# Patient Record
Sex: FEMALE | Race: WHITE | NOT HISPANIC OR LATINO | Employment: OTHER | URBAN - METROPOLITAN AREA
[De-identification: names, ages, dates, MRNs, and addresses within clinical notes are randomized per-mention and may not be internally consistent; named-entity substitution may affect disease eponyms.]

---

## 2017-01-20 ENCOUNTER — TRANSFERRED RECORDS (OUTPATIENT)
Dept: HEALTH INFORMATION MANAGEMENT | Facility: CLINIC | Age: 67
End: 2017-01-20

## 2017-02-07 ENCOUNTER — OFFICE VISIT (OUTPATIENT)
Dept: FAMILY MEDICINE | Facility: CLINIC | Age: 67
End: 2017-02-07
Payer: COMMERCIAL

## 2017-02-07 VITALS
TEMPERATURE: 98.2 F | OXYGEN SATURATION: 96 % | WEIGHT: 245.2 LBS | SYSTOLIC BLOOD PRESSURE: 124 MMHG | BODY MASS INDEX: 40.8 KG/M2 | DIASTOLIC BLOOD PRESSURE: 70 MMHG | RESPIRATION RATE: 16 BRPM | HEART RATE: 81 BPM

## 2017-02-07 DIAGNOSIS — R31.9 URINARY TRACT INFECTION WITH HEMATURIA, SITE UNSPECIFIED: ICD-10-CM

## 2017-02-07 DIAGNOSIS — R39.15 URINARY URGENCY: Primary | ICD-10-CM

## 2017-02-07 DIAGNOSIS — N39.0 URINARY TRACT INFECTION WITH HEMATURIA, SITE UNSPECIFIED: ICD-10-CM

## 2017-02-07 LAB
ALBUMIN UR-MCNC: NEGATIVE MG/DL
APPEARANCE UR: ABNORMAL
BACTERIA #/AREA URNS HPF: ABNORMAL /HPF
BILIRUB UR QL STRIP: NEGATIVE
COLOR UR AUTO: YELLOW
GLUCOSE UR STRIP-MCNC: NEGATIVE MG/DL
HGB UR QL STRIP: ABNORMAL
KETONES UR STRIP-MCNC: NEGATIVE MG/DL
LEUKOCYTE ESTERASE UR QL STRIP: ABNORMAL
NITRATE UR QL: NEGATIVE
NON-SQ EPI CELLS #/AREA URNS LPF: ABNORMAL /LPF
PH UR STRIP: 7.5 PH (ref 5–7)
RBC #/AREA URNS AUTO: ABNORMAL /HPF (ref 0–2)
SP GR UR STRIP: 1.02 (ref 1–1.03)
URN SPEC COLLECT METH UR: ABNORMAL
UROBILINOGEN UR STRIP-ACNC: 0.2 EU/DL (ref 0.2–1)
WBC #/AREA URNS AUTO: ABNORMAL /HPF (ref 0–2)
WBC CLUMPS #/AREA URNS HPF: PRESENT /HPF

## 2017-02-07 PROCEDURE — 99213 OFFICE O/P EST LOW 20 MIN: CPT | Performed by: INTERNAL MEDICINE

## 2017-02-07 PROCEDURE — 87086 URINE CULTURE/COLONY COUNT: CPT | Performed by: INTERNAL MEDICINE

## 2017-02-07 PROCEDURE — 81001 URINALYSIS AUTO W/SCOPE: CPT | Performed by: INTERNAL MEDICINE

## 2017-02-07 RX ORDER — CIPROFLOXACIN 250 MG/1
250 TABLET, FILM COATED ORAL 2 TIMES DAILY
Qty: 6 TABLET | Refills: 0 | Status: SHIPPED | OUTPATIENT
Start: 2017-02-07

## 2017-02-07 NOTE — PROGRESS NOTES
SUBJECTIVE:                                                    Henna Miller is a 66 year old female who presents to clinic today for the following health issues:    URINARY TRACT SYMPTOMS     Onset: last night     Description:   Painful urination (Dysuria): YES  Blood in urine (Hematuria): YES  Delay in urine (Hesitency): YES    Intensity: severe    Progression of Symptoms: worsening    Accompanying Signs & Symptoms:  Fever/chills: no   Flank pain no   Nausea and vomiting: no   Any vaginal symptoms: none  Abdominal/Pelvic Pain: no    History:   History of frequent UTI's: YES  History of kidney stones: no   Sexually Active: no   Possibility of pregnancy: No    Precipitating factors:   none         Therapies Tried and outcome: nothing    Problem list and histories reviewed & adjusted, as indicated.  Additional history: as documented  Patient Active Problem List   Diagnosis     CARDIOVASCULAR SCREENING; LDL GOAL LESS THAN 160     Obese     S/P TKR (total knee replacement)     Past Surgical History   Procedure Laterality Date     Tonsillectomy       Hernia repair, inguinal rt/lt Right      Ingrowth toenail       surgery x 2     Arthroplasty knee Left 7/6/2015     Procedure: ARTHROPLASTY KNEE;  Surgeon: Wang Kendrick MD;  Location:  OR       Social History   Substance Use Topics     Smoking status: Never Smoker      Smokeless tobacco: Never Used     Alcohol Use: Yes     History reviewed. No pertinent family history.      Current Outpatient Prescriptions   Medication Sig Dispense Refill     ciprofloxacin (CIPRO) 250 MG tablet Take 1 tablet (250 mg) by mouth 2 times daily 6 tablet 0     acetaminophen (TYLENOL) 500 MG tablet Take 2 tablets (1,000 mg) by mouth every 6 hours 500 tablet 1     Fexofenadine HCl (MUCINEX ALLERGY PO) Take 180 mg by mouth daily        VENTOLIN  (90 BASE) MCG/ACT inhaler Inhale 1-2 puffs into the lungs every 4 hours as needed   0     Allergies   Allergen Reactions     Bee  Venom Anaphylaxis     PN: Bee stings - anaphalaxis     BP Readings from Last 3 Encounters:   02/07/17 124/70   07/09/15 135/63   12/12/14 128/78    Wt Readings from Last 3 Encounters:   02/07/17 245 lb 3.2 oz (111.222 kg)   07/06/15 230 lb (104.327 kg)   12/12/14 228 lb (103.42 kg)        Labs reviewed in EPIC  Problem list, Medication list, Allergies, and Medical/Social/Surgical histories reviewed in [facility] and updated as appropriate.    ROS:  CONSTITUTIONAL: NEGATIVE for fever, chills, change in weight  ENT/MOUTH: NEGATIVE for ear, mouth and throat problems  RESP: NEGATIVE for significant cough or SOB  GI: POSITIVE for mild diarrhea since finishing amoxicillin 4 days ago, NEGATIVE for nausea, abdominal pain, or heartburn  : POSITIVE for dysuria, hematuria, and hesitancy, no hx of kidney stones   MUSCULOSKELETAL: NEGATIVE for significant arthralgias or myalgia  NEURO: NEGATIVE for weakness, dizziness or paresthesias  PSYCHIATRIC: NEGATIVE for changes in mood or affect    This document serves as a record of the services and decisions personally performed and made by Gita Nelson MD. It was created on her behalf by [place] Rondon, a trained medical scribe. The creation of this document is based on the provider's statements to the medical scribe.   [place] Rondon, 11:51 AM, February 7, 2017    OBJECTIVE:                                                    /70 mmHg  Pulse 81  Temp(Src) 98.2  F (36.8  C) (Oral)  Resp 16  Wt 245 lb 3.2 oz (111.222 kg)  SpO2 96%  Body mass index is 40.8 kg/(m^2).  GENERAL APPEARANCE: healthy, alert and no distress  ABDOMEN: soft, nontender, and bowel sounds normal  MS: extremities normal - no gross deformities noted, no CVA tenderness  NEURO: Normal strength and tone, mentation intact and speech normal  PSYCH: mentation appears normal and affect normal/bright    Diagnostic Test Results:  Results for orders placed or performed in visit on 02/07/17 (from the past 24 hour(s))   *UA  reflex to Microscopic and Culture ([facility] (except [place] and [place])   Result Value Ref Range    Color Urine Yellow     Appearance Urine Slightly Cloudy     Glucose Urine Negative NEG mg/dL    Bilirubin Urine Negative NEG    Ketones Urine Negative NEG mg/dL    Specific Gravity Urine 1.020 1.003 - 1.035    Blood Urine Moderate (A) NEG    pH Urine 7.5 (H) 5.0 - 7.0 pH    Protein Albumin Urine Negative NEG mg/dL    Urobilinogen Urine 0.2 0.2 - 1.0 EU/dL    Nitrite Urine Negative NEG    Leukocyte Esterase Urine Small (A) NEG    Source Midstream Urine    Urine Microscopic   Result Value Ref Range    WBC Urine 10-25 (A) 0 - 2 /HPF    RBC Urine 10-25 (A) 0 - 2 /HPF    WBC Clumps Present (A) NEG /HPF    Squamous Epithelial /LPF Urine Few FEW /LPF    Bacteria Urine Few (A) NEG /HPF        ASSESSMENT/PLAN:                                                    (R39.15) Urinary urgency (primary encounter diagnosis)  Comment: reviewed lab results, culture pending; patient will begin use of Cipro  Plan: *UA reflex to Microscopic and Culture         ([facility] (except         [place] and [place]), Urine Culture Aerobic        Bacterial, Urine Microscopic, CANCELED: UA         reflex to Microscopic and Culture          (N39.0,  R31.9) Urinary tract infection with hematuria, site unspecified  Comment: mild diarrhea when finishing amoxicillin 4 days ago; recommended to begin use of Cipro   Plan: ciprofloxacin (CIPRO) 250 MG tablet     Will culture; best to take meds with foods and can also take probiotics to lessen risk of diarrhea    MEDICATIONS:   Orders Placed This Encounter   Medications     ciprofloxacin (CIPRO) 250 MG tablet     Sig: Take 1 tablet (250 mg) by mouth 2 times daily     Dispense:  6 tablet     Refill:  0     Medications Discontinued During This Encounter   Medication Reason     order for DME Medication Reconciliation Clean Up     naproxen (NAPROSYN)  500 MG tablet Medication Reconciliation Clean Up     senna-docusate (SENOKOT-S;PERICOLACE) 8.6-50 MG per tablet Medication Reconciliation Clean Up     oxyCODONE (ROXICODONE) 5 MG immediate release tablet Medication Reconciliation Clean Up     ondansetron (ZOFRAN-ODT) 4 MG disintegrating tablet Medication Reconciliation Clean Up         Gita Nelson MD  Internal Medicine  [facility]    Attempted to update , her primary clinic is [facility] but has been here several times for acute related health concerns.    The information in this document, created by a medical scribe for me, accurately reflects the services I personally performed and the decisions made by me. I have reviewed and approved this document for accuracy.  Dr. Gita Nelson, 11:57 AM, February 7, 2017

## 2017-02-07 NOTE — MR AVS SNAPSHOT
"              After Visit Summary   2017    Henna Miller    MRN: [id number]           Patient Information     Date Of Birth          1950        Visit Information        Provider Department      2017 11:30 AM Gita Nelson MD [facility] [place]        Today's Diagnoses     Urinary urgency    -  1     Urinary tract infection with hematuria, site unspecified            Follow-ups after your visit        Who to contact     If you have questions or need follow up information about today's clinic visit or your schedule please contact [facility] directly at [phone].  Normal or non-critical lab and imaging results will be communicated to you by Livekickhart, letter or phone within 4 business days after the clinic has received the results. If you do not hear from us within 7 days, please contact the clinic through Livekickhart or phone. If you have a critical or abnormal lab result, we will notify you by phone as soon as possible.  Submit refill requests through [employer] or call your pharmacy and they will forward the refill request to us. Please allow 3 business days for your refill to be completed.          Additional Information About Your Visit        MyChart Information     [employer] lets you send messages to your doctor, view your test results, renew your prescriptions, schedule appointments and more. To sign up, go to [email or web address] . Click on \"Log in\" on the left side of the screen, which will take you to the Welcome page. Then click on \"Sign up Now\" on the right side of the page.     You will be asked to enter the access code listed below, as well as some personal information. Please follow the directions to create your username and password.     Your access code is: [email or web address]  Expires: 2017 11:56 AM     Your access code will  in 90 days. If you need help or a new code, please call your [facility] or [phone].        Care EveryWhere ID     " This is your Care EveryWhere ID. This could be used by other organizations to access your [facility] medical records  [id number]        Your Vitals Were     Pulse Temperature Respirations Pulse Oximetry          81 98.2  F (36.8  C) (Oral) 16 96%         Blood Pressure from Last 3 Encounters:   02/07/17 124/70   07/09/15 135/63   12/12/14 128/78    Weight from Last 3 Encounters:   02/07/17 245 lb 3.2 oz (111.222 kg)   07/06/15 230 lb (104.327 kg)   12/12/14 228 lb (103.42 kg)              We Performed the Following     *UA reflex to Microscopic and Culture ([facility] (except [place] and [place])     Urine Culture Aerobic Bacterial     Urine Microscopic          Today's Medication Changes          These changes are accurate as of: 2/7/17 11:56 AM.  If you have any questions, ask your nurse or doctor.               Start taking these medicines.        Dose/Directions    ciprofloxacin 250 MG tablet   Commonly known as:  CIPRO   Used for:  Urinary tract infection with hematuria, site unspecified   Started by:  Gita Nelson MD        Dose:  250 mg   Take 1 tablet (250 mg) by mouth 2 times daily   Quantity:  6 tablet   Refills:  0            Where to get your medicines      These medications were sent to [facility] [place] - [address]  [address] [place] [address]     Phone:  [phone]    - ciprofloxacin 250 MG tablet             Primary Care Provider Fax #    [facility] [phone]       No address on file        Thank you!     Thank you for choosing [facility]  for your care. Our goal is always to provide you with excellent care. Hearing back from our patients is one way we can continue to improve our services. Please take a few minutes to complete the written survey that you may receive in the mail after your visit with us. Thank you!             Your Updated Medication List - Protect others around you:  Learn how to safely use, store and throw away your medicines at www.disposemymeds.org.          This list is accurate as of: 2/7/17 11:56 AM.  Always use your most recent med list.                   Brand Name Dispense Instructions for use    acetaminophen 500 MG tablet    TYLENOL    500 tablet    Take 2 tablets (1,000 mg) by mouth every 6 hours       ciprofloxacin 250 MG tablet    CIPRO    6 tablet    Take 1 tablet (250 mg) by mouth 2 times daily       MUCINEX ALLERGY PO      Take 180 mg by mouth daily       VENTOLIN  (90 BASE) MCG/ACT Inhaler   Generic drug:  albuterol      Inhale 1-2 puffs into the lungs every 4 hours as needed

## 2017-02-07 NOTE — NURSING NOTE
"Chief Complaint   Patient presents with     Urinary Problem       Initial /70 mmHg  Pulse 81  Temp(Src) 98.2  F (36.8  C) (Oral)  Resp 16  Wt 245 lb 3.2 oz (111.222 kg)  SpO2 96% Estimated body mass index is 40.8 kg/(m^2) as calculated from the following:    Height as of 7/6/15: 5' 5\" (1.651 m).    Weight as of this encounter: 245 lb 3.2 oz (111.222 kg).  Medication Reconciliation: complete    "

## 2017-02-08 LAB
BACTERIA SPEC CULT: NORMAL
MICRO REPORT STATUS: NORMAL
SPECIMEN SOURCE: NORMAL

## 2017-06-22 ENCOUNTER — ALLIED HEALTH/NURSE VISIT (OUTPATIENT)
Dept: NURSING | Facility: CLINIC | Age: 67
End: 2017-06-22
Payer: COMMERCIAL

## 2017-06-22 DIAGNOSIS — H61.21 IMPACTED CERUMEN OF RIGHT EAR: Primary | ICD-10-CM

## 2017-06-22 PROCEDURE — 99207 ZZC NO CHARGE NURSE ONLY: CPT

## 2017-06-22 NOTE — NURSING NOTE
Pt was here for ear wash of right ear. Wax was removed and ear drum was visible once complete.   Brittany Cabezas MA

## 2017-06-22 NOTE — MR AVS SNAPSHOT
"              After Visit Summary   2017    Henna Miller    MRN: [id number]           Patient Information     Date Of Birth          1950        Visit Information        Provider Department      2017 9:30 AM  NURSE [place] [facility] [place]        Today's Diagnoses     Impacted cerumen of right ear    -  1       Follow-ups after your visit        Who to contact     If you have questions or need follow up information about today's clinic visit or your schedule please contact [facility] directly at [phone].  Normal or non-critical lab and imaging results will be communicated to you by Wannadohart, letter or phone within 4 business days after the clinic has received the results. If you do not hear from us within 7 days, please contact the clinic through [employer] or phone. If you have a critical or abnormal lab result, we will notify you by phone as soon as possible.  Submit refill requests through [employer] or call your pharmacy and they will forward the refill request to us. Please allow 3 business days for your refill to be completed.          Additional Information About Your Visit        MyChart Information     [employer] lets you send messages to your doctor, view your test results, renew your prescriptions, schedule appointments and more. To sign up, go to [email or web address] . Click on \"Log in\" on the left side of the screen, which will take you to the Welcome page. Then click on \"Sign up Now\" on the right side of the page.     You will be asked to enter the access code listed below, as well as some personal information. Please follow the directions to create your username and password.     Your access code is: [email or web address]  Expires: 2017  9:39 AM     Your access code will  in 90 days. If you need help or a new code, please call your [facility] or [phone].        Care EveryWhere ID     This is your Care EveryWhere ID. This could be used by other " organizations to access your [place] medical records  [id number]         Blood Pressure from Last 3 Encounters:   02/07/17 124/70   07/09/15 135/63   12/12/14 128/78    Weight from Last 3 Encounters:   02/07/17 245 lb 3.2 oz (111.2 kg)   07/06/15 230 lb (104.3 kg)   12/12/14 228 lb (103.4 kg)              Today, you had the following     No orders found for display       Primary Care Provider Fax #    [facility] [phone]       No address on file        Equal Access to Services     ARIANA TOWNSEND : Hadii sarath Irvin, waandres joyneradaha, qaybcathie kaalmaclaudia barraza, erin veras . So [facility] [phone].    ATENCIÓN: Si habla español, tiene a monroe disposición servicios gratuitos de asistencia lingüística. [facility] [phone].    We comply with applicable federal civil rights laws and [place] laws. We do not discriminate on the basis of race, color, national origin, age, disability sex, sexual orientation or gender identity.            Thank you!     Thank you for choosing [facility]  for your care. Our goal is always to provide you with excellent care. Hearing back from our patients is one way we can continue to improve our services. Please take a few minutes to complete the written survey that you may receive in the mail after your visit with us. Thank you!             Your Updated Medication List - Protect others around you: Learn how to safely use, store and throw away your medicines at www.disposemymeds.org.          This list is accurate as of: 6/22/17  9:39 AM.  Always use your most recent med list.                   Brand Name Dispense Instructions for use Diagnosis    acetaminophen 500 MG tablet    TYLENOL    500 tablet    Take 2 tablets (1,000 mg) by mouth every 6 hours    Status post total left knee replacement       ciprofloxacin 250 MG tablet    CIPRO    6 tablet    Take 1 tablet (250 mg) by mouth 2 times daily    Urinary tract infection with  hematuria, site unspecified       MUCINEX ALLERGY PO      Take 180 mg by mouth daily        VENTOLIN  (90 BASE) MCG/ACT Inhaler   Generic drug:  albuterol      Inhale 1-2 puffs into the lungs every 4 hours as needed

## 2020-11-17 ENCOUNTER — NEW PATIENT (OUTPATIENT)
Dept: URBAN - METROPOLITAN AREA CLINIC 44 | Facility: CLINIC | Age: 70
End: 2020-11-17
Payer: MEDICARE

## 2020-11-17 DIAGNOSIS — H25.813 COMBINED FORMS OF AGE-RELATED CATARACT, BILATERAL: ICD-10-CM

## 2020-11-17 DIAGNOSIS — H04.123 TEAR FILM INSUFFICIENCY OF BILATERAL LACRIMAL GLANDS: Primary | ICD-10-CM

## 2020-11-17 PROCEDURE — 92004 COMPRE OPH EXAM NEW PT 1/>: CPT | Performed by: OPTOMETRIST

## 2020-11-17 ASSESSMENT — INTRAOCULAR PRESSURE
OD: 15
OS: 15

## 2020-11-17 ASSESSMENT — KERATOMETRY
OD: 43.25
OS: 43.75

## 2020-11-17 ASSESSMENT — VISUAL ACUITY
OS: 20/20
OD: 20/20

## 2023-02-27 ENCOUNTER — OFFICE VISIT (OUTPATIENT)
Dept: URGENT CARE | Facility: URGENT CARE | Age: 73
End: 2023-02-27
Payer: MEDICARE

## 2023-02-27 VITALS
BODY MASS INDEX: 29.03 KG/M2 | SYSTOLIC BLOOD PRESSURE: 167 MMHG | OXYGEN SATURATION: 97 % | TEMPERATURE: 97.7 F | HEIGHT: 67 IN | DIASTOLIC BLOOD PRESSURE: 106 MMHG | WEIGHT: 185 LBS | HEART RATE: 76 BPM

## 2023-02-27 DIAGNOSIS — J45.20 MILD INTERMITTENT ASTHMA, UNSPECIFIED WHETHER COMPLICATED: ICD-10-CM

## 2023-02-27 DIAGNOSIS — J01.40 ACUTE NON-RECURRENT PANSINUSITIS: Primary | ICD-10-CM

## 2023-02-27 PROCEDURE — 99204 OFFICE O/P NEW MOD 45 MIN: CPT | Performed by: PHYSICIAN ASSISTANT

## 2023-02-27 RX ORDER — BENZONATATE 100 MG/1
100 CAPSULE ORAL 3 TIMES DAILY PRN
Qty: 21 CAPSULE | Refills: 0 | Status: SHIPPED | OUTPATIENT
Start: 2023-02-27

## 2023-02-27 RX ORDER — ALBUTEROL SULFATE 90 UG/1
2 AEROSOL, METERED RESPIRATORY (INHALATION) EVERY 6 HOURS PRN
Qty: 18 G | Refills: 0 | Status: SHIPPED | OUTPATIENT
Start: 2023-02-27

## 2023-02-27 RX ORDER — FLUTICASONE PROPIONATE 50 MCG
2 SPRAY, SUSPENSION (ML) NASAL DAILY
Qty: 18.2 ML | Refills: 0 | Status: SHIPPED | OUTPATIENT
Start: 2023-02-27

## 2023-02-27 NOTE — PROGRESS NOTES
Acute non-recurrent pansinusitis  - amoxicillin-clavulanate (AUGMENTIN) 875-125 MG tablet; Take 1 tablet by mouth 2 times daily for 10 days  - fluticasone (FLONASE) 50 MCG/ACT nasal spray; Spray 2 sprays into both nostrils daily  - benzonatate (TESSALON) 100 MG capsule; Take 1 capsule (100 mg) by mouth 3 times daily as needed for cough    Mild intermittent asthma, unspecified whether complicated  - albuterol (PROAIR HFA/PROVENTIL HFA/VENTOLIN HFA) 108 (90 Base) MCG/ACT inhaler; Inhale 2 puffs into the lungs every 6 hours as needed for shortness of breath, wheezing or cough    Age 12 months or more  Okay to use Zarbee's   Okay to use Rx Children Tylenol if prescribed (Dose based on weight)    Age 2-12:   Okay to use Children Motrin or Tylenol over the counter.    Adults:  Okay to take acetaminophen 500 mg- 2 tabs (Total of 1000 mg) every 8 hrs   Okay to take ibuprofen 200 mg- 3 tabs (Total of 600 mg) every 6 hours        Okay to use Neti pot for sinus lavage up to three times daily for congestion and sinus pressure if present. Daily hot shower can be beneficial for congestion and body aches. Okay to use bedroom vaporizer or humidifier if symptoms are worse at night. Nightly Vicks Vapor rub and 5-10 mg of Melatonin okay to use for sleep.     Over the counter cough medication and decongestants okay if not prescribed by me during this visit. For homeopathic alternatives to cough syrup and decongestant, feel free to try Elderberry extract.    Okay to use salt water gargles, warm tea (or warm water with lemon and honey), and lozenges for any throat discomfort. Chloraseptic spray is also highly encourages for throat pain/irritation.     Patient will need to get plenty of rest and drink at least 1.5-2 liters of fluids daily for adults and 1-1.5 liters for children. If vomiting and not tolerating liquids for more than 24 hrs, please go to your nearest emergency department for IV fluids and further treatment.     Patient is  not contagious after 1 week from start of symptoms. If possible, wear mask for first 7 days. Wash hands regularly and vigorously for 30 seconds often.     [facility]    Subjective   72 year old who presents to clinic today for the following health issues:    Urgent Care and Sinus Problem       HPI     Acute Illness  Acute illness concerns: Pt in clinic to have eval for cough, congestion, sinus pressure and ear pain for 16 days.  Onset/Duration: 16 days  Symptoms:  Fever: No  Chills/Sweats: No  Headache (location?): YES  Sinus Pressure: YES  Conjunctivitis:  No  Ear Pain: YES: right  Rhinorrhea: YES   Congestion: YES  Sore Throat: At first but not now  Cough: YES  Wheeze: YES Patient has asthma- Patient has improvement with albuterol inhaler.   Decreased Appetite: No  Nausea: No  Vomiting: No  Diarrhea: No  Dysuria/Freq.: No  Dysuria or Hematuria: No  Fatigue/Achiness: No  Sick/Strep Exposure: No  Therapies tried and outcome: Mucinex, Dayquil    Patient is a refill on her albuterol inhaler today.    Review of Systems   Review of Systems   See HPI    Objective    Temp: 97.7  F (36.5  C) Temp src: Temporal BP: (!) 167/106 Pulse: 76     SpO2: 97 %       Physical Exam   Physical Exam  Constitutional:       General: She is not in acute distress.     Appearance: Normal appearance. She is normal weight. She is not ill-appearing, toxic-appearing or diaphoretic.   HENT:      Head: Normocephalic and atraumatic.      Right Ear: Tympanic membrane, ear canal and external ear normal. There is no impacted cerumen.      Left Ear: Tympanic membrane, ear canal and external ear normal. There is no impacted cerumen.      Nose: Congestion and rhinorrhea present.      Right Sinus: Maxillary sinus tenderness and frontal sinus tenderness present.      Left Sinus: Maxillary sinus tenderness and frontal sinus tenderness present.      Mouth/Throat:      Mouth: Mucous membranes are moist.      Pharynx:  Oropharynx is clear. No oropharyngeal exudate or posterior oropharyngeal erythema.   Cardiovascular:      Rate and Rhythm: Normal rate and regular rhythm.      Pulses: Normal pulses.      Heart sounds: Normal heart sounds. No murmur heard.    No friction rub. No gallop.   Pulmonary:      Effort: Pulmonary effort is normal. No respiratory distress.      Breath sounds: No stridor. No wheezing, rhonchi or rales.   Chest:      Chest wall: No tenderness.   Neurological:      Mental Status: She is alert.   Psychiatric:         Mood and Affect: Mood normal.         Behavior: Behavior normal.         Thought Content: Thought content normal.         Judgment: Judgment normal.          No results found for this or any previous visit (from the past 24 hour(s)).

## 2023-04-10 ENCOUNTER — OFFICE VISIT (OUTPATIENT)
Dept: URGENT CARE | Facility: URGENT CARE | Age: 73
End: 2023-04-10
Payer: MEDICARE

## 2023-04-10 VITALS
DIASTOLIC BLOOD PRESSURE: 84 MMHG | SYSTOLIC BLOOD PRESSURE: 139 MMHG | RESPIRATION RATE: 13 BRPM | WEIGHT: 185 LBS | HEIGHT: 67 IN | HEART RATE: 86 BPM | OXYGEN SATURATION: 96 % | TEMPERATURE: 98.9 F | BODY MASS INDEX: 29.03 KG/M2

## 2023-04-10 DIAGNOSIS — H57.9 EYE LESION: Primary | ICD-10-CM

## 2023-04-10 PROCEDURE — 99213 OFFICE O/P EST LOW 20 MIN: CPT | Performed by: PHYSICIAN ASSISTANT

## 2023-04-10 NOTE — PROGRESS NOTES
Eye lesion  Due to the patient's light sensitivity and the unusual appearance of the lesion, I feel that it is more appropriate for the patient to be evaluated by an ophthalmologist today.  I have asked the patient to go to the nearest ER for further work-up from on staff eye specialist.    [facility]    Subjective   72 year old who presents to clinic today for the following health issues:    Eye Problem       HPI     Eye(s) Problem  Onset/Duration: Waking up this morning pt noticed red lt eye, feels warm to touch.  Description:   Location: left eye lid   Pain: YES  Redness: YES  Accompanying Signs & Symptoms:  Discharge/mattering: No  Swelling: YES  Visual changes: No  Fever: No  Nasal Congestion: No  Bothered by bright lights: YES  History:  Trauma: No  Foreign body exposure: No  Wearing contacts: No  Precipitating or alleviating factors: None  Therapies tried and outcome: None      Review of Systems   Review of Systems   See HPI    Objective    Temp: 98.9  F (37.2  C) Temp src: Oral BP: 139/84 Pulse: 86   Resp: 13 SpO2: 96 %       Physical Exam   Physical Exam  Constitutional:       General: She is not in acute distress.     Appearance: Normal appearance. She is not ill-appearing, toxic-appearing or diaphoretic.   HENT:      Head: Normocephalic and atraumatic.   Eyes:        Comments: In the area shown above, the patient has what appears to be a small mass with underlying hemorrhage. It has a similar appearance as a pinguecula however it looks as though it is bleeding.    Cardiovascular:      Rate and Rhythm: Normal rate.      Pulses: Normal pulses.   Pulmonary:      Effort: Pulmonary effort is normal. No respiratory distress.   Neurological:      Mental Status: She is alert.   Psychiatric:         Mood and Affect: Mood normal.         Behavior: Behavior normal.         Thought Content: Thought content normal.         Judgment: Judgment normal.          No results found  for this or any previous visit (from the past 24 hour(s)).